# Patient Record
Sex: MALE | Race: WHITE
[De-identification: names, ages, dates, MRNs, and addresses within clinical notes are randomized per-mention and may not be internally consistent; named-entity substitution may affect disease eponyms.]

---

## 2017-05-16 NOTE — EDM.PDOC
ED HPI GENERAL MEDICAL PROBLEM





- General


Chief Complaint: General


Stated Complaint: SWELLING ON NECK


Time Seen by Provider: 05/16/17 10:41


Source of Information: Reports: Patient, Family, RN Notes Reviewed


History Limitations: Reports: No Limitations





- History of Present Illness


INITIAL COMMENTS - FREE TEXT/NARRATIVE: 


A 56-year-old gentleman presents emergency department day complaint of right-

sided neck swelling, he is post operative cervical disc surgery approximately 6 

months ago.  He has a known history of pulmonary embolism was placed on xeralto 

unfortunately 6 days postop he developed a hematoma in his neck that required 

evacuation.  That was on April 2.  He was subsequently placed on eloquis has 

done well with that medication until today when he awoke developing a hematoma 

on the right side of his neck anterior portion states no difficulty with 

breathing however does have difficulty with swallowing he is able to still 

swallow his saliva but has not tried any water this morning, 








- Related Data


 Allergies











Allergy/AdvReac Type Severity Reaction Status Date / Time


 


oxycodone AdvReac Mild Itching Verified 05/27/15 11:23











Home Meds: 


 Home Meds





Aspirin [Halfprin] 81 mg PO DAILY 05/24/15 [History]


Cyclobenzaprine [Flexeril] 5 mg PO Q6HR PRN 05/24/15 [History]


Omeprazole [Prilosec] 20 mg PO DAILY 05/24/15 [History]


amLODIPine/Valsartan [Amlodipine-Valsartan  mg] 1 tab PO DAILY 05/24/15 [

History]


Carisoprodol [Soma] 350 mg PO QID PRN 05/27/15 [History]


HYDROmorphone HCl [Dilaudid] 4 mg PO Q4H 05/27/15 [History]


Carvedilol 3.125 mg PO BID 08/07/16 [History]


Zolpidem [Ambien] 10 mg PO DAILY 08/07/16 [History]


Apixaban [Eliquis] 5 mg PO BID 05/16/17 [History]


Hydrocodone/Acetaminophen [Hydrocodon-Acetaminophen 5-325] 1 each PO PRN 05/16/ 17 [History]











Past Medical History


Cardiovascular History: Reports: Hypertension


Musculoskeletal History: Reports: Other (See Below)


Other Musculoskeletal History: Mri years ago with disc involvment.  neck fusion 

on April 2017  then swelling and hematoma on right side of neck.  surgery for I 

& D and on vent for  2 days


Oncologic (Cancer) History: Reports: Malignant Melanoma, Prostate


Other Oncologic History: 7 years ago- radiation


Dermatologic History: Reports: Melanoma





- Past Surgical History


Other Cardiovascular Surgeries/Procedures: angiogram between 5- 10 yrs ago


Other Neurological Surgeries/Procedures: fusion of neck for herniated disk





Social & Family History





- Tobacco Use


Smoking Status *Q: Never Smoker


Second Hand Smoke Exposure: Yes





- Caffeine Use


Caffeine Use: Reports: Coffee





- Alcohol Use


Days Per Week of Alcohol Use: 4


Number of Drinks Per Day: 1


Total Drinks Per Week: 4





- Recreational Drug Use


Recreational Drug Use: No





ED ROS GENERAL





- Review of Systems


Review Of Systems: See Below


Constitutional: Reports: No Symptoms


HEENT: Reports: Throat Pain, Throat Swelling


Respiratory: Reports: No Symptoms


Cardiovascular: Reports: No Symptoms


GI/Abdominal: Reports: Difficulty Swallowing.  Denies: Nausea, Vomiting


: Reports: No Symptoms


Musculoskeletal: Reports: No Symptoms


Skin: Reports: No Symptoms


Neurological: Reports: No Symptoms





ED EXAM, GENERAL





- Physical Exam


Exam: See Below


Exam Limited By: No Limitations


General Appearance: Alert, WD/WN, No Apparent Distress


Eye Exam: Bilateral Eye: Normal Inspection


Neck: Limited Range of Motion, Other (Tender hematoma is appreciated right side 

of the neck lateral to the cricoid cartilage).  No: Lymphadenopathy (R), 

Lymphadenopathy (L)


Respiratory/Chest: No Respiratory Distress, Lungs Clear, Normal Breath Sounds, 

No Accessory Muscle Use


Cardiovascular: Regular Rate, Rhythm, No Murmur


GI/Abdominal: Soft, Non-Tender





Course





- Vital Signs


Last Recorded V/S: 


 Last Vital Signs











Temp  101.3 F H  05/16/17 12:26


 


Pulse  105 H  05/16/17 12:26


 


Resp  15   05/16/17 12:26


 


BP  151/89 H  05/16/17 12:26


 


Pulse Ox  94 L  05/16/17 12:26














- Orders/Labs/Meds


Orders: 


 Active Orders 24 hr











 Category Date Time Status


 


 Peripheral IV Care [RC] .AS DIRECTED Care  05/16/17 10:34 Active


 


 CULTURE BLOOD [BC] Urgent Lab  05/16/17 12:33 Ordered


 


 CULTURE BLOOD [BC] Urgent Lab  05/16/17 12:33 Ordered


 


 Iopamidol [Isovue-300 (61%)] Med  05/16/17 10:49 Active





 100 ml IV .AS DIRECTED PRN   


 


 Piperacillin/Tazobactam [Zosyn] 3.375 gm Med  05/16/17 12:45 Ordered





 Sodium Chloride 0.9% [Normal Saline] 50 ml   





 IV Q6H   


 


 Sodium Chloride 0.9% [Normal Saline] 1,000 ml Med  05/16/17 10:45 Active





 IV ASDIRECTED   


 


 Sodium Chloride 0.9% [Normal Saline] 70 ml Med  05/16/17 11:00 Active





 IV ASDIRECTED   


 


 Sodium Chloride 0.9% [Saline Flush] Med  05/16/17 10:33 Active





 10 ml FLUSH ASDIRECTED PRN   


 


 Vancomycin 1 gm Med  05/16/17 12:32 Ordered





 Sodium Chloride 0.9% [Normal Saline] 250 ml   





 IV ONETIME   


 


 Blood Culture x2 Reflex Set [OM.PC] Urgent Ot  05/16/17 12:33 Ordered


 


 Peripheral IV Insertion Adult [OM.PC] Urgent Ot  05/16/17 10:33 Ordered








 Medication Orders





Sodium Chloride (Normal Saline)  1,000 mls @ 500 mls/hr IV ASDIRECTED [facility]


   Last Admin: 05/16/17 10:59  Dose: 500 mls/hr


Sodium Chloride (Normal Saline)  70 mls @ 3 mls/sec IV ASDIRECTED [facility]


   Last Admin: 05/16/17 11:44  Dose: 3 mls/sec


Piperacillin Sod/Tazobactam (Sod 3.375 gm/ Sodium Chloride)  50 mls @ 100 mls/

hr IV Q6H NEVAEH


Vancomycin HCl 1 gm/ Sodium (Chloride)  250 mls @ 150 mls/hr IV ONETIME ONE


   Stop: 05/16/17 14:11


Iopamidol (Isovue-300 (61%))  100 ml IV .AS DIRECTED PRN


   PRN Reason: RADIOLOGY EXAM


   Stop: 05/17/17 10:50


   Last Admin: 05/16/17 11:44  Dose: 100 ml


Sodium Chloride (Saline Flush)  10 ml FLUSH ASDIRECTED PRN


   PRN Reason: Keep Vein Open








Labs: 


 Laboratory Tests











  05/16/17 05/16/17 05/16/17 Range/Units





  10:52 10:52 10:52 


 


WBC  17.8 H    (4.5-11.0)  K/uL


 


RBC  5.08    (4.30-5.90)  M/uL


 


Hgb  14.3    (12.0-15.0)  g/dL


 


Hct  43.2    (40.0-54.0)  %


 


MCV  85    (80-98)  fL


 


MCH  28    (27-31)  pg


 


MCHC  33    (32-36)  %


 


Plt Count  471 H    (150-400)  K/uL


 


Neut % (Auto)  78 H    (36-66)  %


 


Lymph % (Auto)  11 L    (24-44)  %


 


Mono % (Auto)  10 H    (2-6)  %


 


Eos % (Auto)  1 L    (2-4)  %


 


Baso % (Auto)  0    (0-1)  %


 


PT   10.4   (9.5-12.0)  sec


 


INR   0.98   (0.80-1.20)  


 


APTT   26.7 L   (27.0-36.0)  sec


 


Sodium    139 L  (140-148)  mmol/L


 


Potassium    4.1  (3.6-5.2)  mmol/L


 


Chloride    102  (100-108)  mmol/L


 


Carbon Dioxide    24  (21-32)  mmol/L


 


Anion Gap    17.1 H  (5.0-14.0)  mmol/L


 


BUN    9  (7-18)  mg/dL


 


Creatinine    0.9  (0.8-1.3)  mg/dL


 


Est Cr Clr Drug Dosing    85.69  mL/min


 


Estimated GFR (MDRD)    > 60  (>60)  


 


Glucose    110 H  ()  mg/dL


 


Lactic Acid     (0.4-2.0)  mmol/L


 


Calcium    9.0  (8.5-10.1)  mg/dL


 


Total Bilirubin    0.5  (0.2-1.0)  mg/dL


 


AST    35  (15-37)  U/L


 


ALT    60  (12-78)  U/L


 


Alkaline Phosphatase    113  ()  U/L


 


C-Reactive Protein     (0.0-0.3)  mg/dL


 


Total Protein    7.6  (6.4-8.2)  g/dL


 


Albumin    4.1  (3.4-5.0)  g/dL


 


Globulin    3.5  (2.3-3.5)  g/dL


 


Albumin/Globulin Ratio    1.2  (1.2-2.2)  














  05/16/17 05/16/17 Range/Units





  10:52 12:15 


 


WBC    (4.5-11.0)  K/uL


 


RBC    (4.30-5.90)  M/uL


 


Hgb    (12.0-15.0)  g/dL


 


Hct    (40.0-54.0)  %


 


MCV    (80-98)  fL


 


MCH    (27-31)  pg


 


MCHC    (32-36)  %


 


Plt Count    (150-400)  K/uL


 


Neut % (Auto)    (36-66)  %


 


Lymph % (Auto)    (24-44)  %


 


Mono % (Auto)    (2-6)  %


 


Eos % (Auto)    (2-4)  %


 


Baso % (Auto)    (0-1)  %


 


PT    (9.5-12.0)  sec


 


INR    (0.80-1.20)  


 


APTT    (27.0-36.0)  sec


 


Sodium    (140-148)  mmol/L


 


Potassium    (3.6-5.2)  mmol/L


 


Chloride    (100-108)  mmol/L


 


Carbon Dioxide    (21-32)  mmol/L


 


Anion Gap    (5.0-14.0)  mmol/L


 


BUN    (7-18)  mg/dL


 


Creatinine    (0.8-1.3)  mg/dL


 


Est Cr Clr Drug Dosing    mL/min


 


Estimated GFR (MDRD)    (>60)  


 


Glucose    ()  mg/dL


 


Lactic Acid  2.0   (0.4-2.0)  mmol/L


 


Calcium    (8.5-10.1)  mg/dL


 


Total Bilirubin    (0.2-1.0)  mg/dL


 


AST    (15-37)  U/L


 


ALT    (12-78)  U/L


 


Alkaline Phosphatase    ()  U/L


 


C-Reactive Protein   1.44 H  (0.0-0.3)  mg/dL


 


Total Protein    (6.4-8.2)  g/dL


 


Albumin    (3.4-5.0)  g/dL


 


Globulin    (2.3-3.5)  g/dL


 


Albumin/Globulin Ratio    (1.2-2.2)  











Meds: 


Medications











Generic Name Dose Route Start Last Admin





  Trade Name Freq  PRN Reason Stop Dose Admin


 


Sodium Chloride  1,000 mls @ 500 mls/hr  05/16/17 10:45  05/16/17 10:59





  Normal Saline  IV   500 mls/hr





  ASDIRECTED NEVAEH   Administration


 


Sodium Chloride  70 mls @ 3 mls/sec  05/16/17 11:00  05/16/17 11:44





  Normal Saline  IV   3 mls/sec





  ASDIRECTED NEVAEH   Administration


 


Piperacillin Sod/Tazobactam  50 mls @ 100 mls/hr  05/16/17 12:45  





  Sod 3.375 gm/ Sodium Chloride  IV   





  Q6H NEVAEH   


 


Vancomycin HCl 1 gm/ Sodium  250 mls @ 150 mls/hr  05/16/17 12:32  





  Chloride  IV  05/16/17 14:11  





  ONETIME ONE   


 


Iopamidol  100 ml  05/16/17 10:49  05/16/17 11:44





  Isovue-300 (61%)  IV  05/17/17 10:50  100 ml





  .AS DIRECTED PRN   Administration





  RADIOLOGY EXAM   


 


Sodium Chloride  10 ml  05/16/17 10:33  





  Saline Flush  FLUSH   





  ASDIRECTED PRN   





  Keep Vein Open   














Discontinued Medications














Generic Name Dose Route Start Last Admin





  Trade Name Freq  PRN Reason Stop Dose Admin


 


Acetaminophen  650 mg  05/16/17 12:32  





  Tylenol  PO  05/16/17 12:33  





  NOW ONE   


 


Hydromorphone HCl  1 mg  05/16/17 12:32  





  Dilaudid  IVPUSH  05/16/17 12:33  





  ONETIME ONE   


 


Sodium Chloride  10 ml  05/16/17 10:49  





  Saline Flush  FLUSH  05/16/17 10:50  





  ONETIME ONE   














Departure





- Departure


Time of Disposition: 12:37


Disposition: DC/[facility]fer to Acute [facility]


Condition: good


Clinical Impression: 


 Neck abscess








- Discharge Information


Forms:  ED Department Discharge





- My Orders


Last 24 Hours: 


My Active Orders





05/16/17 10:33


Sodium Chloride 0.9% [Saline Flush]   10 ml FLUSH ASDIRECTED PRN 


Peripheral IV Insertion Adult [OM.PC] Urgent 





05/16/17 10:34


Peripheral IV Care [RC] .AS DIRECTED 





05/16/17 10:45


Sodium Chloride 0.9% [Normal Saline] 1,000 ml IV ASDIRECTED 





05/16/17 10:49


Iopamidol [Isovue-300 (61%)]   100 ml IV .AS DIRECTED PRN 





05/16/17 11:00


Sodium Chloride 0.9% [Normal Saline] 70 ml IV ASDIRECTED 





05/16/17 12:32


Vancomycin 1 gm   Sodium Chloride 0.9% [Normal Saline] 250 ml IV ONETIME 





05/16/17 12:33


CULTURE BLOOD [BC] Urgent 


CULTURE BLOOD [BC] Urgent 


Blood Culture x2 Reflex Set [OM.PC] Urgent 





05/16/17 12:45


Piperacillin/Tazobactam [Zosyn] 3.375 gm   Sodium Chloride 0.9% [Normal Saline] 

50 ml IV Q6H 














- Assessment/Plan


Last 24 Hours: 


My Active Orders





05/16/17 10:33


Sodium Chloride 0.9% [Saline Flush]   10 ml FLUSH ASDIRECTED PRN 


Peripheral IV Insertion Adult [OM.PC] Urgent 





05/16/17 10:34


Peripheral IV Care [RC] .AS DIRECTED 





05/16/17 10:45


Sodium Chloride 0.9% [Normal Saline] 1,000 ml IV ASDIRECTED 





05/16/17 10:49


Iopamidol [Isovue-300 (61%)]   100 ml IV .AS DIRECTED PRN 





05/16/17 11:00


Sodium Chloride 0.9% [Normal Saline] 70 ml IV ASDIRECTED 





05/16/17 12:32


Vancomycin 1 gm   Sodium Chloride 0.9% [Normal Saline] 250 ml IV ONETIME 





05/16/17 12:33


CULTURE BLOOD [BC] Urgent 


CULTURE BLOOD [BC] Urgent 


Blood Culture x2 Reflex Set [OM.PC] Urgent 





05/16/17 12:45


Piperacillin/Tazobactam [Zosyn] 3.375 gm   Sodium Chloride 0.9% [Normal Saline] 

50 ml IV Q6H 











Plan: 





Assessment





Acuity = acute





Site and laterality = fluid collection concern for abscess development 

complicating a patient that is 6 weeks postoperative from cervical discectomy 

and hematoma requiring evacuation as well as hypertension and history of 

malignant melanoma





Etiology  = suspicious for bacterial cause





Manifestations = difficulty swallowing, fever





Location of injury =  home





Lab values = white count elevated at 17.8 consistent leukocytosis, sodium low 

at 139 consistent hyponatremia CT scan of the neck demonstrates a fluid 

collection 2.5 cm greatest diameter with a track leading back to the cervical 

spine there is soft tissue swelling with mild deviation of the trachea no 

airway compromise





Plan


Discussed the case with Dr. Negrete  [facility] in transfer blood cultures have been drawn antibiotics of 

Zosyn and vancomycin initiated as well as one dose of Tylenol he will be 

transferred via EMS services

















Patient was in agreement with the plan all questions were answered,  This note 

was dictated using dragon voice recognition software please call with any 

questions.

## 2017-05-16 NOTE — CT
Soft Tissue Neck w Cont

 

HISTORY: Hematoma right side postop eliquis

 

Axial spiral enhanced CT scan of the cervical spine was obtained along with sagittal and coronal rec
onstructions. The patient has a history of recent neck surgery.

 

FINDINGS: Skin marker was placed over the patient's palpable mass lower neck to the right of midline
. Just deep to this marker is a roundish fluid collection with mild rim enhancement measuring 2.5 x 
1.5 x 2.0 cm in size. From the posterior margin of this fluid collection there is fluid tracking in 
the right parapharyngeal region. This tracks lateral to the thyroid gland and medial to the carotid 
artery and jugular vein. This tracks to posteriorly an oblong fluid collection anterior to the lower
 cervical spine and to the right of the esophagus and thyroid gland. This portion of the fluid colle
ction measures 3.9 cm in greatest oblique AP diameter x 1.0 cm transversely by approximately 4.0 cm 
longitudinally.

The cephalad edge of the fluid collection is at about the level of the patient's intervertebral body
 fusion spacer at C5-6. Possible etiologies could include abscess, seroma, or hematoma. There is jessica
e surrounding soft tissue fullness which could be inflammatory or related to the prior surgery. Ther
e is mild tracheal deviation to the left. Upper airway is widely patent. I see no bony destructive c
hanges.

 

There is mild right paratracheal stranding in the superior mediastinum extending to just above the l
evel of the aortic arch. A couple of small, possibly enhancing, right pretracheal lymph nodes can be
 seen. These are not enlarged by size criteria. No other neck mass or adenopathy is seen. Lung apice
s are clear.

 

IMPRESSION: 

1. At the site of the patient's palpable lump anterior neck to the right of midline there is a fluid
 collection with mild rim enhancement just deep to the skin surface measuring 2.5 cm in greatest pat
meter as described above. There is a fluid tract from the posterior edge of this fluid collection to
 a larger, elongated fluid collection anterior to the lower cervical spine and extending to the righ
t paratracheal region lateral to the right lobe of the thyroid gland. This portion of the fluid elias
ures approximately 3.9 x 1.0 x 4.0 cm in size. There is some surrounding soft tissue swelling with m
ild deviation of the trachea to the left. No airway compromise is seen. Findings are suspicious for 
abscess. Postoperative seroma or hematoma could also be considered.

2. Mild fat stranding is seen in the adjacent right paratracheal region in the superior mediastinum.
 A couple of mildly enhancing right paratracheal lymph nodes are present. These are not enlarged by 
size criteria. Early mediastinitis is not excluded.

 

Findings were discussed with  Officer in the Emergency Department 1205 hours. 

 

Total  mGycm

## 2021-07-08 ENCOUNTER — HOSPITAL ENCOUNTER (EMERGENCY)
Dept: HOSPITAL 11 - JP.ED | Age: 61
Discharge: HOME | End: 2021-07-08
Payer: COMMERCIAL

## 2021-07-08 VITALS — DIASTOLIC BLOOD PRESSURE: 75 MMHG | HEART RATE: 70 BPM | SYSTOLIC BLOOD PRESSURE: 145 MMHG

## 2021-07-08 DIAGNOSIS — Z79.82: ICD-10-CM

## 2021-07-08 DIAGNOSIS — I10: ICD-10-CM

## 2021-07-08 DIAGNOSIS — M19.071: Primary | ICD-10-CM

## 2021-07-08 DIAGNOSIS — Z88.5: ICD-10-CM

## 2021-07-08 NOTE — CR
Ankle Min 3V Rt

 

CLINICAL HISTORY: Swelling

 

FINDINGS: The soft tissues are generally swollen. No acute fracture or

dislocation is noted. Ankle mortise is intact. Articular surfaces are smooth.

There is some mild periarticular spurring at the tibiotalar joint.

 

Impression: No fracture or dislocation or osseous lesion

 

Soft tissue swelling

 

Minimal degenerative spurring

## 2021-07-08 NOTE — EDM.PDOC
ED HPI GENERAL MEDICAL PROBLEM





- General


Chief Complaint: Lower Extremity Injury/Pain


Stated Complaint: SWOLLEN RIGHT ANKLE, FOOT


Time Seen by Provider: 07/08/21 08:05


Source of Information: Reports: Patient


History Limitations: Reports: No Limitations





- History of Present Illness


INITIAL COMMENTS - FREE TEXT/NARRATIVE: 





pt arriverd with a very painful rt ankle. He has had gout in the past but this 

does seem differnt. he has been using indocin without relief. 


Onset: Gradual


Duration: Day(s):, Other (pt is on his feet alot. )


Location: Reports: Lower Extremity, Right


Quality: Reports: Sharp, Stabbing, Other (painful to stand on.)


Improves with: Reports: None


Worsens with: Reports: None


Associated Symptoms: Reports: No Other Symptoms


  ** Right Ankle


Pain Score (Numeric/FACES): 10





- Related Data


                                    Allergies











Allergy/AdvReac Type Severity Reaction Status Date / Time


 


oxycodone AdvReac Mild Itching Verified 07/08/21 08:13











Home Meds: 


                                    Home Meds





Amlodipine Besylate/Valsartan [Amlodipine-Valsartan  mg] 1 tab PO DAILY 

05/24/15 [History]


Aspirin [Halfprin] 81 mg PO DAILY 05/24/15 [History]


HYDROmorphone HCl [Dilaudid] 4 mg PO Q4H PRN 05/27/15 [History]


carvediloL [Carvedilol] 3.125 mg PO BID 08/07/16 [History]


cephALEXin [Cephalexin] 500 mg PO BID 07/08/21 [History]











Past Medical History


Cardiovascular History: Reports: Hypertension


Musculoskeletal History: Reports: Other (See Below)


Other Musculoskeletal History: Mri years ago with disc involvment.  neck fusion 

on April 2017  then swelling and hematoma on right side of neck.  surgery for I 

& D and on vent for  2 days


Oncologic (Cancer) History: Reports: Malignant Melanoma, Prostate


Other Oncologic History: 7 years ago- radiation


Dermatologic History: Reports: Melanoma





- Past Surgical History


Other Cardiovascular Surgeries/Procedures: angiogram between 5- 10 yrs ago


Other Neurological Surgeries/Procedures: fusion of neck for herniated disk





Social & Family History





- Caffeine Use


Caffeine Use: Reports: Coffee





Review of Systems





- Review of Systems


Review Of Systems: See Below


Constitutional: Reports: No Symptoms


Eyes: Reports: No Symptoms


Ears: Reports: No Symptoms


Nose: Reports: No Symptoms


Mouth/Throat: Reports: No Symptoms


Respiratory: Reports: No Symptoms


Cardiovascular: Reports: No Symptoms


GI/Abdominal: Reports: No Symptoms


Genitourinary: Reports: No Symptoms


Musculoskeletal: Reports: Other (painful swollen rt ankle. This has been going 

on for several days. )





ED EXAM, GENERAL





- Physical Exam


Exam: See Below


Free Text/Narrative:: 





pt has a swollen painfuil rt ankle. This is tenderto palpate. It does not look r

ed. 


Exam Limited By: No Limitations


General Appearance: Alert, Anxious


Extremities: Other (pt is very uncomfortable to weight bear. He has not had a 

injury. He has been using indocin.  The ankle is swollen and the lateral aspect 

of the foot is swollen. This is very tender to palpate. )


Neurological: Alert, Oriented, Normal Cognition


Psychiatric: Normal Affect





Course





- Vital Signs


Last Recorded V/S: 


                                Last Vital Signs











Temp  36.7 C   07/08/21 08:22


 


Pulse  70   07/08/21 08:22


 


Resp  12   07/08/21 08:22


 


BP  145/75 H  07/08/21 08:22


 


Pulse Ox  99   07/08/21 08:22














- Orders/Labs/Meds


Orders: 


                               Active Orders 24 hr











 Category Date Time Status


 


 Consult to Orthopedic Clinic [CONS] Routine Cons  07/08/21 08:59 Active


 


 Ankle Min 3V Rt [CR] Stat Exams  07/08/21 07:57 Taken











Labs: 


                                Laboratory Tests











  07/08/21 07/08/21 07/08/21 Range/Units





  07:58 08:08 08:08 


 


WBC  8.3    (4.5-11.0)  K/uL


 


RBC  4.63    (4.30-5.90)  M/uL


 


Hgb  13.7    (12.0-15.0)  g/dL


 


Hct  40.6    (40.0-54.0)  %


 


MCV  88    (80-98)  fL


 


MCH  30    (27-31)  pg


 


MCHC  34    (32-36)  %


 


Plt Count  332    (150-400)  K/uL


 


Neut % (Auto)  72.5 H    (36-66)  %


 


Lymph % (Auto)  13.2 L    (24-44)  %


 


Mono % (Auto)  9.7 H    (2-6)  %


 


Eos % (Auto)  4.2 H    (2-4)  %


 


Baso % (Auto)  0.4    (0-1)  %


 


Uric Acid   5.4   (3.5-7.2)  mg/dL


 


C-Reactive Protein    2.96 H  (0.0-0.3)  mg/dL











Meds: 


Medications














Discontinued Medications














Generic Name Dose Route Start Last Admin





  Trade Name Freq  PRN Reason Stop Dose Admin


 


Triamcinolone Acetonide  60 mg  07/08/21 08:55 





  Triamcinolone Acetonide 40 Mg/Ml 1 Ml Sdv  INJECT  07/08/21 08:56 





  ASDIRECTED ONE  














- Re-Assessments/Exams


Free Text/Narrative Re-Assessment/Exam: 





07/08/21 09:12


pt did have a xray of the ankle which showed alot of degenerative changes in the

 joint. 





Departure





- Departure


Time of Disposition: 08:57


Disposition: Home, Self-Care 01


Condition: Fair


Clinical Impression: 


 Acute arthritis








- Discharge Information


Referrals: 


Cornell Webber MD [Primary Care Provider] - 


Forms:  ED Department Discharge


Care Plan Goals: 


 soak in warm water bid followed by a cool pack, elevate when sitting, appt with

ortho. predisone 10 mg daily for 1 week, naprosyn 500m tid for 1 week with food.

 norco 5/325 q6h prn for pain, # 10. 





Sepsis Event Note (ED)





- Focused Exam


Vital Signs: 


                                   Vital Signs











  Temp Pulse Resp BP Pulse Ox


 


 07/08/21 08:22  36.7 C  70  12  145/75 H  99


 


 07/08/21 08:03  36.7 C  70  12  145/75 H  99














- My Orders


Last 24 Hours: 


My Active Orders





07/08/21 07:57


Ankle Min 3V Rt [CR] Stat 





07/08/21 08:59


Consult to Orthopedic Clinic [CONS] Routine 














- Assessment/Plan


Last 24 Hours: 


My Active Orders





07/08/21 07:57


Ankle Min 3V Rt [CR] Stat 





07/08/21 08:59


Consult to Orthopedic Clinic [CONS] Routine